# Patient Record
Sex: MALE | Race: WHITE | NOT HISPANIC OR LATINO | Employment: FULL TIME | URBAN - METROPOLITAN AREA
[De-identification: names, ages, dates, MRNs, and addresses within clinical notes are randomized per-mention and may not be internally consistent; named-entity substitution may affect disease eponyms.]

---

## 2021-02-27 ENCOUNTER — TELEPHONE (OUTPATIENT)
Dept: UROLOGY | Facility: AMBULATORY SURGERY CENTER | Age: 56
End: 2021-02-27

## 2021-02-27 NOTE — TELEPHONE ENCOUNTER
Please Triage [facility]   New Patient-     What is the reason for the patients appointment? Testicle cyst - ref Dr Burk Rom [phone]     Imaging/Lab Results: [place] -[facility]      Do we accept the patient's insurance or is the patient Self-Pay? Provider & Plan: [facility]   Member ID#: [id number]     Has the patient had any previous urologist(s)? Yes, [facility]           Have patient records been requested? Pt was given the fax number to fax all records  Has the patient had any outside testing done?  [facility] - [place]      Does the patient have a personal history of cancer? no      Patient can be reached at :[phone]

## 2021-03-01 NOTE — TELEPHONE ENCOUNTER
Patient called in to check on status  I advised that we have not received his records  I advised for him to reach out to his PCP office and have them sent over so the [address] can be reviewed to get scheduled for an appointment  Please advise

## 2021-03-05 RX ORDER — CHOLECALCIFEROL (VITAMIN D3) 1250 MCG
1 CAPSULE ORAL WEEKLY
COMMUNITY
Start: 2020-12-21

## 2021-03-05 RX ORDER — MECLIZINE HCL 12.5 MG/1
12.5 TABLET ORAL 3 TIMES DAILY PRN
COMMUNITY
Start: 2020-12-17

## 2021-03-05 RX ORDER — CICLOPIROX 7.7 MG/G
GEL TOPICAL
COMMUNITY
Start: 2019-12-09

## 2021-03-05 RX ORDER — TIMOLOL MALEATE 6.8 MG/ML
SOLUTION/ DROPS OPHTHALMIC
COMMUNITY
Start: 2015-06-01

## 2021-03-08 ENCOUNTER — CONSULT (OUTPATIENT)
Dept: UROLOGY | Facility: MEDICAL CENTER | Age: 56
End: 2021-03-08
Payer: COMMERCIAL

## 2021-03-08 VITALS
BODY MASS INDEX: 30.96 KG/M2 | HEART RATE: 93 BPM | DIASTOLIC BLOOD PRESSURE: 80 MMHG | HEIGHT: 69 IN | SYSTOLIC BLOOD PRESSURE: 110 MMHG | WEIGHT: 209 LBS

## 2021-03-08 DIAGNOSIS — N50.811 PAIN IN BOTH TESTICLES: Primary | ICD-10-CM

## 2021-03-08 DIAGNOSIS — N50.812 PAIN IN BOTH TESTICLES: Primary | ICD-10-CM

## 2021-03-08 DIAGNOSIS — N43.40 SPERMATOCELE: ICD-10-CM

## 2021-03-08 LAB
SL AMB  POCT GLUCOSE, UA: NORMAL
SL AMB LEUKOCYTE ESTERASE,UA: NORMAL
SL AMB POCT BILIRUBIN,UA: NORMAL
SL AMB POCT BLOOD,UA: NORMAL
SL AMB POCT CLARITY,UA: CLEAR
SL AMB POCT COLOR,UA: YELLOW
SL AMB POCT KETONES,UA: NORMAL
SL AMB POCT NITRITE,UA: NORMAL
SL AMB POCT PH,UA: 5
SL AMB POCT SPECIFIC GRAVITY,UA: >=1.03
SL AMB POCT URINE PROTEIN: NORMAL
SL AMB POCT UROBILINOGEN: 0.2

## 2021-03-08 PROCEDURE — 99244 OFF/OP CNSLTJ NEW/EST MOD 40: CPT | Performed by: UROLOGY

## 2021-03-08 PROCEDURE — 81003 URINALYSIS AUTO W/O SCOPE: CPT | Performed by: UROLOGY

## 2021-03-08 NOTE — PROGRESS NOTES
HISTORY:     painful left spermatocele for 6-8 months  He says he also has pain in testicle on that left side, and also right testicle pain  First had a testicle cyst evaluated in [place], then relocated to this area recently  No urinary symptoms              ASSESSMENT / PLAN:    4 cm left spermatocele, moderately tender  However, the Lt testicle is also tender,  and his right testicle is tender with no findings whatsoever  Ultrasound described the 4 cm left cyst   It also says he has a 6 mm right epididymal cyst   I cannot palpate that  I have been careful to tell patient that the spermatocele could be the cause of tenderness and pain at the  leftupper pole  However, I am not sure why he has pain in the body of the testicle also on left side  and certainly no reason  Why he would have pain in the right testicle  I think it is possible he has tender testicles, and is quite jumpy about the exam     I described spermatocelectomy, postop pain and swelling  Occasionally hematoma that can last for two months  Also small but possible risk of damage to the blood supply to the left testicle which would cause testicle atrophy     I have also been careful also to tell him he could have pain in testicles afterwards, because  the left spermatocele does not explain all this pain on both sides  With above discussion, he wants to have the spermatocele taking care of and we will plan surgery    The following portions of the patient's history were reviewed and updated as appropriate: allergies, current medications, past family history, past medical history, past social history, past surgical history and problem list     Review of Systems   All other systems reviewed and are negative  Objective:     Physical Exam  Genitourinary:     Comments:  Penis  normal   Both testicles are mildly tender  Left upper pole spermatocele, very tender      Prostate minimally enlarged no nodules No results found for: PSA]  No results found for: BUN  No results found for: CREATININE  No components found for: CBC      Patient Active Problem List   Diagnosis    Spermatocele    Pain in both testicles        Diagnoses and all orders for this visit:    Pain in both testicles  -     POCT urine dip auto non-scope    Spermatocele  -     Case request operating room: SPERMATOCELECTOMY; Standing  -     Case request operating room: SPERMATOCELECTOMY    Other orders  -     ciclopirox (LOPROX) 0 77 % cream; Apply topically 2 (two) times a day  -     Diet NPO; Sips with meds; Standing  -     Place sequential compression device; Standing           Patient ID: Ramy Rdz is a 60 y o  male  Current Outpatient Medications:     Cholecalciferol (Vitamin D3) 1 25 MG (28496 UT) CAPS, Take 1 capsule by mouth once a week, Disp: , Rfl:     ciclopirox (LOPROX) 0 77 % cream, Apply topically 2 (two) times a day, Disp: , Rfl:     metoprolol tartrate (LOPRESSOR) 25 mg tablet, , Disp: , Rfl:     Timolol Maleate 0 5 % (DAILY) SOLN, , Disp: , Rfl:     Ciclopirox 0 77 % gel, , Disp: , Rfl:     meclizine (ANTIVERT) 12 5 MG tablet, Take 12 5 mg by mouth 3 (three) times a day as needed, Disp: , Rfl:     No past medical history on file  No past surgical history on file      Social History

## 2021-03-09 ENCOUNTER — TELEPHONE (OUTPATIENT)
Dept: UROLOGY | Facility: MEDICAL CENTER | Age: 56
End: 2021-03-09

## 2021-06-02 ENCOUNTER — APPOINTMENT (OUTPATIENT)
Dept: URGENT CARE | Age: 56
End: 2021-06-02
Payer: OTHER MISCELLANEOUS

## 2021-06-02 ENCOUNTER — APPOINTMENT (OUTPATIENT)
Dept: RADIOLOGY | Age: 56
End: 2021-06-02
Payer: OTHER MISCELLANEOUS

## 2021-06-02 DIAGNOSIS — T14.90XA INJURY: ICD-10-CM

## 2021-06-02 DIAGNOSIS — T14.90XA INJURY: Primary | ICD-10-CM

## 2021-06-02 PROCEDURE — G0382 LEV 3 HOSP TYPE B ED VISIT: HCPCS | Performed by: PHYSICIAN ASSISTANT

## 2021-06-02 PROCEDURE — 99283 EMERGENCY DEPT VISIT LOW MDM: CPT | Performed by: PHYSICIAN ASSISTANT

## 2021-06-02 PROCEDURE — 72072 X-RAY EXAM THORAC SPINE 3VWS: CPT

## 2021-11-26 ENCOUNTER — APPOINTMENT (OUTPATIENT)
Dept: RADIOLOGY | Age: 56
End: 2021-11-26
Attending: PHYSICIAN ASSISTANT
Payer: OTHER MISCELLANEOUS

## 2021-11-26 ENCOUNTER — APPOINTMENT (OUTPATIENT)
Dept: URGENT CARE | Age: 56
End: 2021-11-26
Payer: OTHER MISCELLANEOUS

## 2021-11-26 DIAGNOSIS — T14.90XA INJURY: ICD-10-CM

## 2021-11-26 DIAGNOSIS — T14.90XA INJURY: Primary | ICD-10-CM

## 2021-11-26 PROCEDURE — 99283 EMERGENCY DEPT VISIT LOW MDM: CPT | Performed by: PHYSICIAN ASSISTANT

## 2021-11-26 PROCEDURE — G0382 LEV 3 HOSP TYPE B ED VISIT: HCPCS | Performed by: PHYSICIAN ASSISTANT

## 2021-11-26 PROCEDURE — 73110 X-RAY EXAM OF WRIST: CPT
